# Patient Record
Sex: MALE | Race: BLACK OR AFRICAN AMERICAN | NOT HISPANIC OR LATINO | URBAN - METROPOLITAN AREA
[De-identification: names, ages, dates, MRNs, and addresses within clinical notes are randomized per-mention and may not be internally consistent; named-entity substitution may affect disease eponyms.]

---

## 2024-03-21 ENCOUNTER — EMERGENCY (EMERGENCY)
Facility: HOSPITAL | Age: 34
LOS: 1 days | Discharge: HOME | End: 2024-03-21
Admitting: EMERGENCY MEDICINE
Payer: SELF-PAY

## 2024-03-21 VITALS
DIASTOLIC BLOOD PRESSURE: 92 MMHG | OXYGEN SATURATION: 99 % | WEIGHT: 139.99 LBS | SYSTOLIC BLOOD PRESSURE: 139 MMHG | TEMPERATURE: 98 F | RESPIRATION RATE: 20 BRPM | HEART RATE: 98 BPM | HEIGHT: 67 IN

## 2024-03-21 DIAGNOSIS — Z20.2 CONTACT WITH AND (SUSPECTED) EXPOSURE TO INFECTIONS WITH A PREDOMINANTLY SEXUAL MODE OF TRANSMISSION: ICD-10-CM

## 2024-03-21 DIAGNOSIS — R11.0 NAUSEA: ICD-10-CM

## 2024-03-21 DIAGNOSIS — F17.200 NICOTINE DEPENDENCE, UNSPECIFIED, UNCOMPLICATED: ICD-10-CM

## 2024-03-21 DIAGNOSIS — Z11.52 ENCOUNTER FOR SCREENING FOR COVID-19: ICD-10-CM

## 2024-03-21 DIAGNOSIS — Z20.822 CONTACT WITH AND (SUSPECTED) EXPOSURE TO COVID-19: ICD-10-CM

## 2024-03-21 LAB — SARS-COV-2 RNA SPEC QL NAA+PROBE: SIGNIFICANT CHANGE UP

## 2024-03-21 PROCEDURE — 99053 MED SERV 10PM-8AM 24 HR FAC: CPT

## 2024-03-21 PROCEDURE — 99284 EMERGENCY DEPT VISIT MOD MDM: CPT

## 2024-03-21 RX ORDER — ONDANSETRON 8 MG/1
4 TABLET, FILM COATED ORAL ONCE
Refills: 0 | Status: COMPLETED | OUTPATIENT
Start: 2024-03-21 | End: 2024-03-21

## 2024-03-21 RX ADMIN — ONDANSETRON 4 MILLIGRAM(S): 8 TABLET, FILM COATED ORAL at 07:33

## 2024-03-21 RX ADMIN — Medication 30 MILLILITER(S): at 07:33

## 2024-03-21 NOTE — ED PROVIDER NOTE - NSFOLLOWUPINSTRUCTIONS_ED_ALL_ED_FT
Follow up with your primary care doctor or clinics listed below if you do not have a doctor,    [facility]  [address] [place] [id number]  To make an appointment, call [phone]    [facility]  Address: [address] [place] [id number]  Appointment Center: [phone] ([phone])     [facility] is a good referral line for crisis and substance abuse help.  [facility] has drop in programs all over the [facility].    [facility] (25+) - [address] [address] (Subway: #6 to [facility])    [facility] (18+) - [address] [facility] (Subway: A to [facility])    Drop-In Centers for Adults:  [facility] (18+) - [address] (Near [facility] - Subway: 1/2/3/A/C/E to [address] [facility])     Return to the ER for Emergencies.  Return immediately for any new or worsening symptoms or any new concerns INR (international normal ratio) abnormal

## 2024-03-21 NOTE — ED PROVIDER NOTE - CARE PLAN
Principal Discharge DX:	General medical exam  Secondary Diagnosis:	Encounter for screening for COVID-19   1

## 2024-03-21 NOTE — ED ADULT TRIAGE NOTE - CHIEF COMPLAINT QUOTE
Pt walked into ER c/o nausea after smoking weed earlier tonight. Pt denies any injuries or further at triage. Ambulating without issue. -PMH/NKDA.

## 2024-03-21 NOTE — ED ADULT NURSE NOTE - OBJECTIVE STATEMENT
pt reports nausea without vomiting; denies abdominal pain, chest pain, SOB; hx marijuana use, last smoked last night; pt alert & oriented x4, in no acute distress

## 2024-03-21 NOTE — ED PROVIDER NOTE - OBJECTIVE STATEMENT
34 yo M with no known PMHx, presenting c/o feeling nauseous after drinking alcohol last night and smoking weed this morning. reports not eating much and now has nausea but asking for some food "to settle my stomach." Of note, pt also reports best friend recently tested positive for COVID and he was exposed. Currently asymptomatic but desires to be tested. Denies fever, chills, V/D/C, melena, hematochezia, hematuria, change in urinary/bowel function, dysuria, flank pain, HA, LOC, focal weakness, CP, SOB, palpitations, cough, and malaise.

## 2024-03-21 NOTE — ED PROVIDER NOTE - PATIENT PORTAL LINK FT
You can access the FollowMyHealth Patient Portal offered by [facility] by registering at the following website: [email or web address]. By joining [employer]’s FollowMyHealth portal, you will also be able to view your health information using other applications (apps) compatible with our system.

## 2024-03-21 NOTE — ED PROVIDER NOTE - CLINICAL SUMMARY MEDICAL DECISION MAKING FREE TEXT BOX
medical screening exam has been performed.  Pt with no acute trauma or emergencies noted and exam wnl. reports feeling nauseous s/p alcohol and THC, no associated pain, abd soft and exam unremarkable. Offered food, tolerated po without N/V. COVID screening test also obtained and sent due to pt's recent exposure concerns. hemodynamically stable and non toxic appearing, medically stable for dc. f/u instructions have been provided

## 2024-03-21 NOTE — ED PROVIDER NOTE - PHYSICAL EXAMINATION
Gen - WDWN, NAD, comfortable and non-toxic appearing  Skin - warm, dry, intact   HEENT - AT/NC, no nasal discharge, airway patent, neck supple and FROM  CV - S1S2, R/R/R  Resp - CTAB, no r/r/w  GI - soft, ND, NT, no CVAT b/l, no rebound or guarding, no palpable pulsatile mass   MS - No acute or gross deformities noted to extremities. No midline spinal tenderness or step off on palpation  Neuro - AxOx3, ambulatory without gait disturbance

## 2024-03-21 NOTE — ED ADULT NURSE NOTE - NSFALLUNIVINTERV_ED_ALL_ED
Bed/Stretcher in lowest position, wheels locked, appropriate side rails in place/Call bell, personal items and telephone in reach/Instruct patient to call for assistance before getting out of bed/chair/stretcher/Non-slip footwear applied when patient is off stretcher/[place] to call system/Physically safe environment - no spills, clutter or unnecessary equipment/Purposeful proactive rounding/Room/bathroom lighting operational, light cord in reach

## 2025-05-29 NOTE — ED PROVIDER NOTE - WET READ LAUNCH FT
HEARING AID DROP OFF    Audiology    :  Oticon Model/style/color: OPN1 mini [facility]  Date of purchase/dispense date: 5/2/2019   REPAIR WARRANTY EXPIRATION DATE: 6/1/2022   Loss and Damage expiration date: 6/1/2022  Services/supplies covered until: 5/2/21019  Payor: [employer]    Battery size: 312  Supplies/wax filters: pro wax  /retention loop: size 4  wire    Earmold : Oticon (Left)  ---Earmold style and material:acrylic power mold  ---Earmold warranty expiration date: No Warranty   Earmold : Oticon (Right)  ---Earmold SN: [id number]  ---Earmold style and material:acrylic power mold  ---Earmold warranty expiration date:10/27/2026     used: [employer]    Updated by NANCIE Waldrop on 5/2/2019    REASON FOR VISIT:  Dropped off left aid    SERVICES PROVIDED:  Reattached the  to the mold.     FOLLOW-UP:  As needed    
There are no Wet Read(s) to document.